# Patient Record
Sex: FEMALE | Race: BLACK OR AFRICAN AMERICAN | Employment: UNEMPLOYED | URBAN - METROPOLITAN AREA
[De-identification: names, ages, dates, MRNs, and addresses within clinical notes are randomized per-mention and may not be internally consistent; named-entity substitution may affect disease eponyms.]

---

## 2017-06-05 ENCOUNTER — APPOINTMENT (OUTPATIENT)
Dept: GENERAL RADIOLOGY | Age: 37
End: 2017-06-05
Attending: EMERGENCY MEDICINE

## 2017-06-05 ENCOUNTER — HOSPITAL ENCOUNTER (EMERGENCY)
Age: 37
Discharge: HOME | End: 2017-06-05
Attending: EMERGENCY MEDICINE

## 2017-06-05 VITALS
SYSTOLIC BLOOD PRESSURE: 117 MMHG | RESPIRATION RATE: 16 BRPM | HEART RATE: 72 BPM | DIASTOLIC BLOOD PRESSURE: 92 MMHG | TEMPERATURE: 98 F | OXYGEN SATURATION: 100 %

## 2017-06-05 DIAGNOSIS — R10.9 ABDOMINAL CRAMPING: ICD-10-CM

## 2017-06-05 DIAGNOSIS — R10.32 ABDOMINAL PAIN, LLQ (LEFT LOWER QUADRANT): Primary | ICD-10-CM

## 2017-06-05 LAB
APPEARANCE UR: CLEAR
BILIRUB UR QL: NEGATIVE
COLOR UR: YELLOW
GLUCOSE UR STRIP.AUTO-MCNC: NEGATIVE MG/DL
HCG UR QL: NEGATIVE
HGB UR QL STRIP: NEGATIVE
KETONES UR QL STRIP.AUTO: 15 MG/DL
LEUKOCYTE ESTERASE UR QL STRIP.AUTO: NEGATIVE
NITRITE UR QL STRIP.AUTO: NEGATIVE
PH UR STRIP: 5.5 [PH] (ref 5–8)
PROT UR STRIP-MCNC: NEGATIVE MG/DL
SP GR UR REFRACTOMETRY: >1.03 (ref 1–1.03)
UROBILINOGEN UR QL STRIP.AUTO: 1 EU/DL (ref 0.2–1)

## 2017-06-05 PROCEDURE — 81025 URINE PREGNANCY TEST: CPT

## 2017-06-05 PROCEDURE — 99283 EMERGENCY DEPT VISIT LOW MDM: CPT

## 2017-06-05 PROCEDURE — 81003 URINALYSIS AUTO W/O SCOPE: CPT | Performed by: EMERGENCY MEDICINE

## 2017-06-05 RX ORDER — HALOPERIDOL 0.5 MG/1
TABLET ORAL
COMMUNITY

## 2017-06-05 RX ORDER — DICYCLOMINE HYDROCHLORIDE 20 MG/1
20 TABLET ORAL EVERY 6 HOURS
Qty: 20 TAB | Refills: 0 | Status: SHIPPED | OUTPATIENT
Start: 2017-06-05 | End: 2017-06-10

## 2017-06-05 NOTE — DISCHARGE INSTRUCTIONS

## 2017-06-05 NOTE — ED NOTES
I have reviewed discharge instructions with the patient. The patient verbalized understanding. Discharge medications reviewed with patient and appropriate educational materials and side effects teaching were provided. Pt leaving ambulatory today with cab service to take her home. Armband removed and shredded.

## 2017-06-05 NOTE — ED PROVIDER NOTES
HPI Comments: 3:02 AM    Ana Cuba is a 31 y.o. female with pertinent PMHx of HTN presenting ambulatory to the ED c/o intermittent episodes of aching/burning LLQ abdominal pain, with episodes lasting a few seconds, x ~2 days. Pt denies any radiation of her pain. Pt notes associated symptom of urinary urgency. Pt denies any chance of pregnancy due to her LMP starting May 14th. Pt states that her bowel movements have been regular. Pt denies any history of abdominal surgery, excluding two C-sections. Pt specifically denies any N/V/D, vaginal bleeding or dysuria. PCP: None  Social Hx: - tobacco use, - alcohol use, - illicit drug use    There are no other complaints, changes, or physical findings at this time. The history is provided by the patient. No  was used. Past Medical History:   Diagnosis Date    Hypertension     Psychiatric disorder     bipolar       Past Surgical History:   Procedure Laterality Date    HX  SECTION      HX TONSILLECTOMY           History reviewed. No pertinent family history. Social History     Social History    Marital status: SINGLE     Spouse name: N/A    Number of children: N/A    Years of education: N/A     Occupational History    Not on file. Social History Main Topics    Smoking status: Former Smoker    Smokeless tobacco: Not on file    Alcohol use No    Drug use: No    Sexual activity: Yes     Birth control/ protection: None     Other Topics Concern    Not on file     Social History Narrative         ALLERGIES: Review of patient's allergies indicates no known allergies. Review of Systems   Constitutional: Negative. Negative for chills and fever. HENT: Negative. Eyes: Negative. Respiratory: Negative. Negative for cough, shortness of breath and wheezing. Cardiovascular: Negative. Negative for chest pain. Gastrointestinal: Positive for abdominal pain (LLQ). Negative for diarrhea, nausea and vomiting. Genitourinary: Positive for urgency. Negative for dysuria and vaginal bleeding. Skin: Negative. Neurological: Negative. Psychiatric/Behavioral: Negative. All other systems reviewed and are negative. Vitals:    06/05/17 0250   BP: 130/82   Pulse: 60   Resp: 16   Temp: 97.9 °F (36.6 °C)   SpO2: 100%            Physical Exam   Constitutional: She is oriented to person, place, and time. She appears well-developed and well-nourished. Comfortable appearing, nontoxic. Sleepy, but arouses to light voice. HENT:   Head: Normocephalic and atraumatic. Right Ear: External ear normal.   Left Ear: External ear normal.   Nose: Nose normal.   Mouth/Throat: Oropharynx is clear and moist.   Eyes: Conjunctivae and EOM are normal. Pupils are equal, round, and reactive to light. Right eye exhibits no discharge. Left eye exhibits no discharge. No scleral icterus. Neck: Normal range of motion. Neck supple. No JVD present. No tracheal deviation present. Cardiovascular: Normal rate, regular rhythm, normal heart sounds and intact distal pulses. Pulmonary/Chest: Effort normal and breath sounds normal.   Abdominal: Soft. Bowel sounds are normal. She exhibits no distension. There is no tenderness. There is no rebound and no guarding. TTP to her LLQ, without peritoneal signs. Musculoskeletal: Normal range of motion. Neurological: She is alert and oriented to person, place, and time. She has normal reflexes. No focal motor weakness. Skin: Skin is warm and dry. No rash noted. Psychiatric: She has a normal mood and affect. Her behavior is normal.   Nursing note and vitals reviewed.      RESULTS:    CARDIAC MONITOR NOTE:  Cardiac Rhythm: NSR  Rate: 65 bpm     PULSE OXIMETRY NOTE:  Pulse-ox is 100% on RA  Interpretation: NML       No orders to display        Labs Reviewed   URINALYSIS W/ RFLX MICROSCOPIC - Abnormal; Notable for the following:        Result Value    Specific gravity >1.030 (*)     Ketone 15 (*)     All other components within normal limits   HCG URINE, QL. - POC   POC URINE PREGNANCY TEST       Recent Results (from the past 12 hour(s))   URINALYSIS W/ RFLX MICROSCOPIC    Collection Time: 06/05/17  2:55 AM   Result Value Ref Range    Color YELLOW      Appearance CLEAR      Specific gravity >1.030 (H) 1.005 - 1.030    pH (UA) 5.5 5.0 - 8.0      Protein NEGATIVE  NEG mg/dL    Glucose NEGATIVE  NEG mg/dL    Ketone 15 (A) NEG mg/dL    Bilirubin NEGATIVE  NEG      Blood NEGATIVE  NEG      Urobilinogen 1.0 0.2 - 1.0 EU/dL    Nitrites NEGATIVE  NEG      Leukocyte Esterase NEGATIVE  NEG     HCG URINE, QL. - POC    Collection Time: 06/05/17  3:11 AM   Result Value Ref Range    Pregnancy test,urine (POC) NEGATIVE  NEG          MDM  Number of Diagnoses or Management Options  Diagnosis management comments: Her LLQ pain is only brief, for seconds at a time. More likely bowel spasms or obstipation, rather than ileus or obstruction. Possible kidney problems, but brief in nature. Unlikely to represent significant bladder or  infection or torsion. Pt is not pregnant and UA shows mild dehydration. 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 Amount and/or Complexity of Data Reviewed  Clinical lab tests: ordered and reviewed  Review and summarize past medical records: yes    Patient Progress  Patient progress: stable    ED Course     Medications - No data to display     Procedures    PROGRESS NOTE:  3:02 AM  Initial assessment performed. Written by Nish aVlera ED Scribe, as dictated by Chinyere Telles MD.    3:50 AM - Pt refuses X-rays at this time. DISCHARGE NOTE:  3:58 AM  The patient is ready for discharge. The patient's signs, symptoms, diagnosis, and discharge instructions have been discussed and the patient and/or family has conveyed their understanding. The patient and/or family is to follow up as recommended or return to the ER should their symptoms worsen.  Plan has been discussed and the patient and/or family is in agreement. Written by Jorge Boudreaux ED Scribe, as dictated by Sharmaine Luther MD.     CLINICAL IMPRESSION:    1. Abdominal pain, LLQ (left lower quadrant)    2. Abdominal cramping        PLAN:  1. D/C Home  2. Current Discharge Medication List      START taking these medications    Details   dicyclomine (BENTYL) 20 mg tablet Take 1 Tab by mouth every six (6) hours for 20 doses. Qty: 20 Tab, Refills: 0           3. Follow-up Information     Follow up With Details Comments Contact Info    [facility] Schedule an appointment as soon as possible for a visit for [place] up [address] [facility], [address] [facility] [address]    [facility]  As needed, If symptoms worsen [address] [place] [address]  [phone]          SCRIBE ATTESTATION:  This note is prepared by Rylan Mcdonnell, acting as Scribe for [employer]. Bobby Luther MD.    PROVIDER ATTESTATION:  Jesus. Bobby Luther MD: The scribe's documentation has been prepared under my direction and personally reviewed by me in its entirety. I confirm that the note above accurately reflects all work, treatment, procedures, and medical decision making performed by me.

## 2017-06-05 NOTE — ED TRIAGE NOTES
Lower abdominal pain since Saturday per patient, denies nausea, vomiting, pt removes her jacket and has a pink hard plastic helmet on and does not remove it when asked to removed everything from the waist up.

## 2017-06-05 NOTE — ED NOTES
Pt hourly rounding competed. Sleeping in bed with lights out. Provided warm blankets for comfort. No other needs at this time. Call bell within reach. Safety   Pt (x) resting on stretcher with side rails up and call bell in reach. () in chair    () in parents arms. Toileting   Pt offered ()Bedpan     (x)Assistance to Restroom     ()Urinal  Ongoing Updates  Updated on plan of care and status of test results.   Pain Management  Inquired as to comfort and offered comfort measures:    (x) warm blankets   (x) dimmed lights

## 2017-06-07 ENCOUNTER — HOSPITAL ENCOUNTER (EMERGENCY)
Age: 37
Discharge: HOME | End: 2017-06-07
Attending: EMERGENCY MEDICINE | Admitting: EMERGENCY MEDICINE
Payer: SELF-PAY

## 2017-06-07 VITALS
TEMPERATURE: 97.9 F | WEIGHT: 147 LBS | BODY MASS INDEX: 23.07 KG/M2 | OXYGEN SATURATION: 100 % | DIASTOLIC BLOOD PRESSURE: 77 MMHG | SYSTOLIC BLOOD PRESSURE: 108 MMHG | HEIGHT: 67 IN | HEART RATE: 85 BPM | RESPIRATION RATE: 16 BRPM

## 2017-06-07 DIAGNOSIS — R10.9 CHRONIC ABDOMINAL PAIN: ICD-10-CM

## 2017-06-07 DIAGNOSIS — R07.89 ATYPICAL CHEST PAIN: ICD-10-CM

## 2017-06-07 DIAGNOSIS — F20.9 SCHIZOPHRENIA, UNSPECIFIED TYPE (HCC): ICD-10-CM

## 2017-06-07 DIAGNOSIS — S90.829A BLISTER OF FOOT WITHOUT INFECTION, UNSPECIFIED LATERALITY, INITIAL ENCOUNTER: Primary | ICD-10-CM

## 2017-06-07 DIAGNOSIS — G89.29 CHRONIC ABDOMINAL PAIN: ICD-10-CM

## 2017-06-07 LAB
ATRIAL RATE: 66 BPM
CALCULATED P AXIS, ECG09: -20 DEGREES
CALCULATED R AXIS, ECG10: 53 DEGREES
CALCULATED T AXIS, ECG11: 54 DEGREES
DIAGNOSIS, 93000: NORMAL
P-R INTERVAL, ECG05: 136 MS
Q-T INTERVAL, ECG07: 442 MS
QRS DURATION, ECG06: 90 MS
QTC CALCULATION (BEZET), ECG08: 463 MS
VENTRICULAR RATE, ECG03: 66 BPM

## 2017-06-07 PROCEDURE — 93005 ELECTROCARDIOGRAM TRACING: CPT

## 2017-06-07 PROCEDURE — 99284 EMERGENCY DEPT VISIT MOD MDM: CPT

## 2017-06-07 NOTE — ED NOTES
Presented to ED to be evaluated for reported abdominal pain and right breast pain x \"some months\". Patient also reports right foot pain without injury reported. Patient is noted to be ambulating without difficulty noted. Patient is resting to comfort at time of assessment with no s/s distress noted.

## 2017-06-07 NOTE — ED PROVIDER NOTES
HPI Comments: 1:53 AM    Ramon Burleson is a 31 y.o. female with pertinent PMHx of HTN and bipolar disorder presenting ambulatory to the ED c/o abdominal pain, chest pain and bilateral foot pain x \"a couple of weeks\". Pt states that \"I think I have sleep apnea\", noting that she stops breathing for a few seconds. Pt notes a family hx of cardiac problems, but is unable to specify. Pt specifically denies any SOB or nausea/vomiting. Per chart review, pt has been [facility] ED multiple times with normal chest pain work ups. PCP: None  Social Hx: - tobacco use, - alcohol use, - illicit drug use    There are no other complaints, changes, or physical findings at this time. The history is provided by the patient. No  was used. Past Medical History:   Diagnosis Date    Hypertension     Psychiatric disorder     bipolar    Psychiatric disorder     schizoprenia       Past Surgical History:   Procedure Laterality Date    HX  SECTION      HX TONSILLECTOMY           History reviewed. No pertinent family history. Social History     Social History    Marital status: SINGLE     Spouse name: N/A    Number of children: N/A    Years of education: N/A     Occupational History    Not on file. Social History Main Topics    Smoking status: Former Smoker    Smokeless tobacco: Not on file    Alcohol use No    Drug use: No    Sexual activity: Yes     Birth control/ protection: None     Other Topics Concern    Not on file     Social History Narrative         ALLERGIES: Review of patient's allergies indicates no known allergies. Review of Systems   Respiratory: Negative for shortness of breath. Cardiovascular: Positive for chest pain. Gastrointestinal: Positive for abdominal pain. Musculoskeletal: Positive for myalgias (bilateral foot pain). All other systems reviewed and are negative.       Vitals:    17 0132   BP: 108/77   Pulse: 85   Resp: 16   Temp: 97.9 °F (36.6 °C)   SpO2: 100%   Weight: 66.7 kg (147 lb)   Height: 5' 7\" (1.702 m)            Physical Exam   Constitutional: She is oriented to person, place, and time. She appears well-developed and well-nourished. No distress. Comfortable appearing. Sleeping quietly on the bed. Rouses to voice, yet keeps eyes closed   HENT:   Head: Normocephalic and atraumatic. Eyes: EOM are normal. Pupils are equal, round, and reactive to light. No pallor or icterus. Neck: Normal range of motion. Neck supple. Cardiovascular: Normal rate, normal heart sounds and intact distal pulses. Pulmonary/Chest: Effort normal and breath sounds normal. No respiratory distress. She exhibits no tenderness. Abdominal: Soft. Bowel sounds are normal. She exhibits no distension. There is no tenderness. Musculoskeletal: Normal range of motion. She exhibits no edema. Neurological: She is alert and oriented to person, place, and time. Skin: Skin is warm and dry. No rash noted. Multiple blisters to both feet, without redness or vesicles. Psychiatric:   Mostly flat affect, keeps eyes closed until we make her laugh, at which point she opens her eyes and makes good eye contact. She gives very little medical hx, answering in only 1-2 word responses. With diminished insight and thought content. Nursing note and vitals reviewed. RESULTS:    CARDIAC MONITOR NOTE:  Cardiac Rhythm: NSR  Rate: 85 bpm     PULSE OXIMETRY NOTE:  Pulse-ox is 100% on RA  Interpretation: [facility]       EKG interpretation: (Preliminary) at 0212  Rhythm: normal sinus rhythm; Rate (approx.): 66 bpm; Non specific T-wave and prolonged QT. EKG is unchanged since 20th of May 2016. Written by LEELEE Strickland, as dictated by Jose Alberto Verdugo MD.      No orders to display        Labs Reviewed - No data to display    No results found for this or any previous visit (from the past 12 hour(s)).       MDM  Number of Diagnoses or Management Options  Diagnosis management comments: She is a schizophrenic pt, well known to this department, with chronic complaints. EKG is unchanged. She has no changes in her abdominal exam. Her feet did have multiple blisters, we did provide bandages and OTC triple antibiotic for treatment. Amount and/or Complexity of Data Reviewed  Tests in the medicine section of CPT®: ordered and reviewed (EKG)  Independent visualization of images, tracings, or specimens: yes (EKG)      ED Course     Medications - No data to display     Procedures    PROGRESS NOTE:  1:53 AM  Initial assessment performed. Written by Timothy Streeter ED Scribe, as dictated by Jose Winter MD.    DISCHARGE NOTE:  2:30 AM  The patient is ready for discharge. The patient's signs, symptoms, diagnosis, and discharge instructions have been discussed and the patient and/or family has conveyed their understanding. The patient and/or family is to follow up as recommended or return to the ER should their symptoms worsen. Plan has been discussed and the patient and/or family is in agreement. Written by LEELEE Sheppardibe, as dictated by Jose Winter MD.     CLINICAL IMPRESSION:    1. Blister of foot without infection, unspecified laterality, initial encounter    2. Atypical chest pain    3. Schizophrenia, unspecified type ([address]    4. Chronic abdominal pain        PLAN:  1. D/C Home  2. Current Discharge Medication List        3. Follow-up Information     Follow up With Details Comments Contact Info    [facility] Schedule an appointment as soon as possible for a visit for PCP follow up, at the [facility] [address] [facility], [address] [place] [address] [address] [place] [address] [address]    [facility]  As needed, If symptoms worsen [address] [phone]          SCRIBE ATTESTATION:  This note is prepared by Timothy Streeter, acting as Scribe for Jesus.  Brittaney Winter MD.    PROVIDER ATTESTATION:  Jesus. Tracy Zambrano MD: The scribe's documentation has been prepared under my direction and personally reviewed by me in its entirety. I confirm that the note above accurately reflects all work, treatment, procedures, and medical decision making performed by me.

## 2017-06-07 NOTE — DISCHARGE INSTRUCTIONS
Chest Pain: Care Instructions  Your Care Instructions  There are many things that can cause chest pain. Some are not serious and will get better on their own in a few days. But some kinds of chest pain need more testing and treatment. Your doctor may have recommended a follow-up visit in the next 8 to 12 hours. If you are not getting better, you may need more tests or treatment. Even though your doctor has released you, you still need to watch for any problems. The doctor carefully checked you, but sometimes problems can develop later. If you have new symptoms or if your symptoms do not get better, get medical care right away. If you have worse or different chest pain or pressure that lasts more than 5 minutes or you passed out (lost consciousness), call 911 or seek other emergency help right away. A medical visit is only one step in your treatment. Even if you feel better, you still need to do what your doctor recommends, such as going to all suggested follow-up appointments and taking medicines exactly as directed. This will help you recover and help prevent future problems. How can you care for yourself at home? · Rest until you feel better. · Take your medicine exactly as prescribed. Call your doctor if you think you are having a problem with your medicine. · Do not drive after taking a prescription pain medicine. When should you call for help? Call 911 if:  · You passed out (lost consciousness). · You have severe difficulty breathing. · You have symptoms of a heart attack. These may include:  ¨ Chest pain or pressure, or a strange feeling in your chest.  ¨ Sweating. ¨ Shortness of breath. ¨ Nausea or vomiting. ¨ Pain, pressure, or a strange feeling in your back, neck, jaw, or upper belly or in one or both shoulders or arms. ¨ Lightheadedness or sudden weakness. ¨ A fast or irregular heartbeat.   After you call 911, the  may tell you to chew 1 adult-strength or 2 to 4 low-dose aspirin. Wait for an ambulance. Do not try to drive yourself. Call your doctor today if:  · You have any trouble breathing. · Your chest pain gets worse. · You are dizzy or lightheaded, or you feel like you may faint. · You are not getting better as expected. · You are having new or different chest pain. Where can you learn more? Go to http://sunny-bull.info/. Enter A120 in the search box to learn more about \"Chest Pain: Care Instructions. \"  Current as of: May 27, 2016  Content Version: 11.2  © 7708-5271 [employer]. Care instructions adapted under license by [employer] (which disclaims liability or warranty for this information). If you have questions about a medical condition or this instruction, always ask your healthcare professional. [address] any warranty or liability for your use of this information.

## 2017-06-07 NOTE — ED TRIAGE NOTES
Pt arrived to ED c/c abdominal pain onset \"months. \" Also reports right breast pain pt states, \"I think I have breast cancer. \" Pt also reports right foot pain onset 4 days, denies any injury.

## 2017-06-12 VITALS
TEMPERATURE: 97.7 F | WEIGHT: 134.48 LBS | HEART RATE: 75 BPM | RESPIRATION RATE: 14 BRPM | BODY MASS INDEX: 21.11 KG/M2 | DIASTOLIC BLOOD PRESSURE: 87 MMHG | HEIGHT: 67 IN | OXYGEN SATURATION: 100 % | SYSTOLIC BLOOD PRESSURE: 125 MMHG

## 2017-06-12 PROCEDURE — 75810000275 HC EMERGENCY DEPT VISIT NO LEVEL OF CARE

## 2017-06-13 ENCOUNTER — HOSPITAL ENCOUNTER (EMERGENCY)
Age: 37
Discharge: HOME | End: 2017-06-13
Attending: EMERGENCY MEDICINE
Payer: SELF-PAY

## 2017-06-13 DIAGNOSIS — Z53.21 PATIENT LEFT WITHOUT BEING SEEN: Primary | ICD-10-CM

## 2017-06-13 NOTE — ED TRIAGE NOTES
Patient reports lower abdominal pain that began 1 weeks ago. Patient also reports a migraine. Sepsis Screening completed    (  )Patient meets SIRS criteria. ( x )Patient does not meet SIRS criteria.       SIRS Criteria is achieved when two or more of the following are present   Temperature < 96.8°F (36°C) or > 100.9°F (38.3°C)   Heart Rate > 90 beats per minute   Respiratory Rate > 20 breaths per minute   WBC count > 12,000 or <4,000 or > 10% bands

## 2017-06-13 NOTE — ED NOTES
Pt not in room yet. Refused to accompany RN to back after being woken up. Pt states \"I've got to go to the bathroom first\".

## 2017-06-13 NOTE — ED NOTES
After pt was woken up and wanted to go to restroom before coming to back to be seen by MD, ED registration states that pt packed up her belongings and walked out of the door.

## 2017-06-13 NOTE — ED PROVIDER NOTES
HPI     Past Medical History:   Diagnosis Date    Hypertension     Psychiatric disorder     bipolar    Psychiatric disorder     schizoprenia       Past Surgical History:   Procedure Laterality Date    HX  SECTION      HX TONSILLECTOMY           History reviewed. No pertinent family history. Social History     Social History    Marital status: SINGLE     Spouse name: N/A    Number of children: N/A    Years of education: N/A     Occupational History    Not on file. Social History Main Topics    Smoking status: Former Smoker    Smokeless tobacco: Not on file    Alcohol use No    Drug use: No    Sexual activity: Yes     Birth control/ protection: None     Other Topics Concern    Not on file     Social History Narrative         ALLERGIES: Review of patient's allergies indicates no known allergies. Review of Systems    Vitals:    17 2357   BP: 125/87   Pulse: 75   Resp: 14   Temp: 97.7 °F (36.5 °C)   SpO2: 100%   Weight: 61 kg (134 lb 7.7 oz)   Height: 5' 7\" (1.702 m)            Physical Exam     MDM  ED Course       Procedures    6:20 AM    I was inadvertently assigned to this patient's treatment team.  I did not see this patient nor did I have any contact with this patient. I had no involvement during the evaluation, treatment or disposition of this patient. I am signing off this note to indicate only why my name appeared in the record.   Zoltan Vidal MD

## 2017-06-22 ENCOUNTER — HOSPITAL ENCOUNTER (EMERGENCY)
Age: 37
Discharge: HOME | End: 2017-06-22
Attending: EMERGENCY MEDICINE | Admitting: EMERGENCY MEDICINE
Payer: SELF-PAY

## 2017-06-22 VITALS
TEMPERATURE: 99.1 F | DIASTOLIC BLOOD PRESSURE: 78 MMHG | OXYGEN SATURATION: 100 % | RESPIRATION RATE: 16 BRPM | HEART RATE: 75 BPM | SYSTOLIC BLOOD PRESSURE: 130 MMHG

## 2017-06-22 DIAGNOSIS — R10.9 RECURRING ABDOMINAL PAIN: Primary | ICD-10-CM

## 2017-06-22 LAB
APPEARANCE UR: CLEAR
BILIRUB UR QL: NEGATIVE
COLOR UR: YELLOW
GLUCOSE UR STRIP.AUTO-MCNC: NEGATIVE MG/DL
HCG UR QL: NEGATIVE
HGB UR QL STRIP: NEGATIVE
KETONES UR QL STRIP.AUTO: ABNORMAL MG/DL
LEUKOCYTE ESTERASE UR QL STRIP.AUTO: NEGATIVE
NITRITE UR QL STRIP.AUTO: NEGATIVE
PH UR STRIP: 5 [PH] (ref 5–8)
PROT UR STRIP-MCNC: NEGATIVE MG/DL
SP GR UR REFRACTOMETRY: >1.03 (ref 1–1.03)
UROBILINOGEN UR QL STRIP.AUTO: 1 EU/DL (ref 0.2–1)

## 2017-06-22 PROCEDURE — 81025 URINE PREGNANCY TEST: CPT | Performed by: EMERGENCY MEDICINE

## 2017-06-22 PROCEDURE — 99284 EMERGENCY DEPT VISIT MOD MDM: CPT

## 2017-06-22 PROCEDURE — 81003 URINALYSIS AUTO W/O SCOPE: CPT | Performed by: EMERGENCY MEDICINE

## 2017-06-22 NOTE — ED PROVIDER NOTES
HPI Comments: Mahogany Maguire is a 31 y.o. Female with  H/o mental health d/o with c/o continued lower abd pain for last several days and believes she is pregnant. She has been seen at [facility] recently for same with unremarkable labs to include [facility], [facility] within last 2 days. The history is provided by the patient. Past Medical History:   Diagnosis Date    Hypertension     Psychiatric disorder     bipolar    Psychiatric disorder     schizoprenia       Past Surgical History:   Procedure Laterality Date    HX  SECTION      HX TONSILLECTOMY           History reviewed. No pertinent family history. Social History     Social History    Marital status: SINGLE     Spouse name: N/A    Number of children: N/A    Years of education: N/A     Occupational History    Not on file. Social History Main Topics    Smoking status: Former Smoker    Smokeless tobacco: Never Used    Alcohol use No    Drug use: No    Sexual activity: Yes     Birth control/ protection: None     Other Topics Concern    Not on file     Social History Narrative         ALLERGIES: Review of patient's allergies indicates no known allergies. Review of Systems   Constitutional: Negative for fever. HENT: Negative for trouble swallowing. Respiratory: Negative for shortness of breath. Cardiovascular: Negative for chest pain. Gastrointestinal: Positive for abdominal pain. Negative for diarrhea, nausea and vomiting. Genitourinary: Negative for dysuria. Musculoskeletal: Negative for gait problem. Skin: Negative for rash. Psychiatric/Behavioral: Negative for sleep disturbance. Vitals:    17 0058   BP: 128/81   Pulse: 79   Resp: 16   Temp: 99.1 °F (37.3 °C)   SpO2: 100%            Physical Exam   Constitutional: She is oriented to person, place, and time. She appears well-developed and well-nourished. No distress. HENT:   Head: Normocephalic and atraumatic.    Right Ear: External ear normal. Left Ear: External ear normal.   Nose: Nose normal.   Mouth/Throat: Uvula is midline, oropharynx is clear and moist and mucous membranes are normal.   Eyes: Conjunctivae are normal. No scleral icterus. Neck: Neck supple. Cardiovascular: Normal rate, regular rhythm, normal heart sounds and intact distal pulses. Pulmonary/Chest: Effort normal and breath sounds normal.   Abdominal: Soft. She exhibits no distension. There is no tenderness. There is no rebound. Musculoskeletal: She exhibits no edema. Neurological: She is alert and oriented to person, place, and time. Gait normal.   Skin: Skin is warm and dry. She is not diaphoretic. Psychiatric: Her behavior is normal.   Nursing note and vitals reviewed. [facility]  ED Course       Procedures    Vitals:  Patient Vitals for the past 12 hrs:   Temp Pulse Resp BP SpO2   06/22/17 0058 99.1 °F (37.3 °C) 79 16 128/81 100 %         Medications ordered:   Medications - No data to display      Lab findings:  Recent Results (from the past 12 hour(s))   URINALYSIS W/ RFLX MICROSCOPIC    Collection Time: 06/22/17  1:25 AM   Result Value Ref Range    Color YELLOW      Appearance CLEAR      Specific gravity >1.030 (H) 1.005 - 1.030    pH (UA) 5.0 5.0 - 8.0      Protein NEGATIVE  NEG mg/dL    Glucose NEGATIVE  NEG mg/dL    Ketone TRACE (A) NEG mg/dL    Bilirubin NEGATIVE  NEG      Blood NEGATIVE  NEG      Urobilinogen 1.0 0.2 - 1.0 EU/dL    Nitrites NEGATIVE  NEG      Leukocyte Esterase NEGATIVE  NEG     HCG URINE, QL    Collection Time: 06/22/17  1:25 AM   Result Value Ref Range    HCG urine, Ql. NEGATIVE  NEG         EKG interpretation by ED Physician:      X-Ray, CT or other radiology findings or impressions:  No orders to display       Progress notes, Consult notes or additional Procedure notes:   Have reviewed most recent visits, labs. Doubt need for other work up here. abd nontender.  Pt told she is not pregnant  I have discussed with patient and/or family/sig other the results, interpretation of any imaging if performed, suspected diagnosis and treatment plan to include instructions regarding the diagnoses listed to which understanding was expressed with all questions answered      Reevaluation of patient:   Stable for dc    Disposition:  Diagnosis:   1. Recurring abdominal pain        Disposition: home      Follow-up Information     Follow up With Details Comments [address] [place] [address] ([address] [facility]) Schedule an appointment as soon as possible for a visit  [facility]  [phone]            Patient's Medications   Start Taking    No medications on file   Continue Taking    HALOPERIDOL (HALDOL) 0.5 MG TABLET    Take  by mouth nightly.    These Medications have changed    No medications on file   Stop Taking    No medications on file

## 2017-06-22 NOTE — ED NOTES
Patient states she has had lower abdominal pain in suprapubic region x 2 weeks. Denies N/V/D. Denies urinary symptoms. Reports vaginal discharge x 2 days.

## 2017-07-10 ENCOUNTER — HOSPITAL ENCOUNTER (EMERGENCY)
Age: 37
Discharge: LEFT BEFORE DISCHARGE | End: 2017-07-10
Attending: EMERGENCY MEDICINE
Payer: SELF-PAY

## 2017-07-10 VITALS
OXYGEN SATURATION: 99 % | HEIGHT: 67 IN | WEIGHT: 140 LBS | DIASTOLIC BLOOD PRESSURE: 92 MMHG | BODY MASS INDEX: 21.97 KG/M2 | SYSTOLIC BLOOD PRESSURE: 134 MMHG | HEART RATE: 84 BPM | RESPIRATION RATE: 14 BRPM | TEMPERATURE: 98.3 F

## 2017-07-10 LAB
APPEARANCE UR: CLEAR
BILIRUB UR QL: NEGATIVE
COLOR UR: YELLOW
GLUCOSE UR STRIP.AUTO-MCNC: NEGATIVE MG/DL
HCG UR QL: NEGATIVE
HGB UR QL STRIP: NEGATIVE
KETONES UR QL STRIP.AUTO: NEGATIVE MG/DL
LEUKOCYTE ESTERASE UR QL STRIP.AUTO: NEGATIVE
NITRITE UR QL STRIP.AUTO: NEGATIVE
PH UR STRIP: 5 [PH] (ref 5–8)
PROT UR STRIP-MCNC: NEGATIVE MG/DL
SP GR UR REFRACTOMETRY: 1.03 (ref 1–1.03)
UROBILINOGEN UR QL STRIP.AUTO: 0.2 EU/DL (ref 0.2–1)

## 2017-07-10 PROCEDURE — 81025 URINE PREGNANCY TEST: CPT | Performed by: EMERGENCY MEDICINE

## 2017-07-10 PROCEDURE — 75810000275 HC EMERGENCY DEPT VISIT NO LEVEL OF CARE

## 2017-07-10 PROCEDURE — 81003 URINALYSIS AUTO W/O SCOPE: CPT | Performed by: EMERGENCY MEDICINE

## 2017-07-10 PROCEDURE — 87491 CHLMYD TRACH DNA AMP PROBE: CPT | Performed by: PHYSICIAN ASSISTANT

## 2017-07-12 LAB
C TRACH RRNA SPEC QL NAA+PROBE: NEGATIVE
N GONORRHOEA RRNA SPEC QL NAA+PROBE: NEGATIVE
SPECIMEN SOURCE: NORMAL